# Patient Record
Sex: MALE | Race: WHITE
[De-identification: names, ages, dates, MRNs, and addresses within clinical notes are randomized per-mention and may not be internally consistent; named-entity substitution may affect disease eponyms.]

---

## 2021-06-13 ENCOUNTER — HOSPITAL ENCOUNTER (EMERGENCY)
Dept: HOSPITAL 56 - MW.ED | Age: 26
Discharge: HOME | End: 2021-06-13
Payer: COMMERCIAL

## 2021-06-13 VITALS — SYSTOLIC BLOOD PRESSURE: 174 MMHG | DIASTOLIC BLOOD PRESSURE: 100 MMHG | HEART RATE: 87 BPM

## 2021-06-13 DIAGNOSIS — M54.41: Primary | ICD-10-CM

## 2021-06-13 DIAGNOSIS — E66.9: ICD-10-CM

## 2021-06-13 PROCEDURE — 96372 THER/PROPH/DIAG INJ SC/IM: CPT

## 2021-06-13 PROCEDURE — 99283 EMERGENCY DEPT VISIT LOW MDM: CPT

## 2021-06-13 NOTE — EDM.PDOC
ED HPI GENERAL MEDICAL PROBLEM





- General


Stated Complaint: BACK AND RT LEG PAIN


Time Seen by Provider: 06/13/21 17:19


Source of Information: Reports: Patient


History Limitations: Reports: No Limitations





- History of Present Illness


INITIAL COMMENTS - FREE TEXT/NARRATIVE: 





18-year-old male past medical history sciatica presents for acute exacerbation 

of chronic low back pain.  Patient first noted symptoms back in March of this 

year and has been seeing his primary care physician.  He has never had advanced 

imaging of the back.  He has been on indomethacin and Flexeril as needed and has

been taking it quite frequently.  He picked up a heavy cooler and exacerbated 

his pain and is now experiencing right-sided low back pain that radiates down 

his posterior leg.  It is difficult to find a position of comfort.  Denies 

dysuria, hematuria, urinary incontinence or retention.  He is ambulatory but 

notes it is worse when he is walking and better with rest.


  ** back


Pain Score (Numeric/FACES): 9





- Related Data


                                    Allergies











Allergy/AdvReac Type Severity Reaction Status Date / Time


 


No Known Allergies Allergy   Verified 06/13/21 17:31











Home Meds: 


                                    Home Meds





Amphetamine/Dextroamphetamine [Adderall XR] 1 tab PO ASDIRECTED 06/13/21 

[History]


Cyclobenzaprine [Flexeril] 1 tab PO ASDIRECTED 06/13/21 [History]


Cyclobenzaprine [Flexeril] 10 mg PO TID PRN #30 tab 06/13/21 [Rx]


Diclofenac Submicronized [Diclofenac] 1 tab PO ASDIRECTED 06/13/21 [History]


Ibuprofen [Motrin] 600 mg PO Q6H PRN #28 tab 06/13/21 [Rx]


diazePAM [Valium] 5 mg PO BID PRN #6 tablet 06/13/21 [Rx]











Past Medical History


Respiratory History: Reports: Sleep Apnea


Gastrointestinal History: Reports: GERD


Psychiatric History: Reports: Anxiety, Depression


Endocrine/Metabolic History: Reports: Obesity/BMI 30+ (lost 35 pounds recently)





- Past Surgical History


Musculoskeletal Surgical History: Reports: Shoulder Surgery





ED ROS GENERAL





- Review of Systems


Review Of Systems: Comprehensive ROS is negative, except as noted in HPI.





ED EXAM, GENERAL





- Physical Exam


Exam: See Below


Exam Limited By: No Limitations


General Appearance: Alert, WD/WN, No Apparent Distress


Ears: Normal External Exam


Nose: Normal Inspection


Throat/Mouth: Normal Voice, No Airway Compromise


Head: Atraumatic, Normocephalic


Neck: Normal Inspection, Supple, Non-Tender


Respiratory/Chest: No Respiratory Distress, Lungs Clear, Normal Breath Sounds, 

No Accessory Muscle Use


Cardiovascular: Normal Peripheral Pulses, Regular Rate, Rhythm


Back Exam: Normal Inspection.  No: Vertebral Tenderness


Extremities: Normal Inspection


Neurological: Alert, Normal Cognition, Normal Gait


Psychiatric: Normal Affect, Normal Mood


Skin Exam: Warm, Dry, Intact, Normal Color





Course





- Vital Signs


Last Recorded V/S: 


                                Last Vital Signs











Temp  97.9 F   06/13/21 17:28


 


Pulse  87   06/13/21 17:28


 


Resp  18   06/13/21 17:28


 


BP  174/100 H  06/13/21 17:28


 


Pulse Ox  98   06/13/21 17:28














- Orders/Labs/Meds


Meds: 


Medications














Discontinued Medications














Generic Name Dose Route Start Last Admin





  Trade Name Genaroq  PRN Reason Stop Dose Admin


 


Dexamethasone  10 mg  06/13/21 17:36 





  Dexamethasone 10 Mg/Ml Sdv  IM  06/13/21 17:37 





  STAT STA  


 


Diazepam  2 mg  06/13/21 17:36  06/13/21 17:39





  Diazepam 2 Mg Tab  PO  06/13/21 17:37  Not Given





  ONETIME ONE  


 


Diazepam  4 mg  06/13/21 17:38 





  Diazepam 2 Mg Tab  PO  06/13/21 17:39 





  ONETIME ONE  


 


Ketorolac Tromethamine  30 mg  06/13/21 17:37 





  Ketorolac 30 Mg/Ml Sdv  IM  06/13/21 17:38 





  ONETIME ONE  














- Re-Assessments/Exams


Free Text/Narrative Re-Assessment/Exam: 





06/13/21 17:44


We will treat for likely acute exacerbation of sciatica with Decadron, Valium, 

Toradol.  Will discharge with Motrin, Flexeril, short course of Valium.  

Explained to patient that he should follow-up with his primary care physician 

and consider MRI imaging of the back to the way of a better idea of exactly what

 is going on.  Explained return precautions including intolerable pain, muscle 

paralysis, urinary incontinence or retention.  Patient understands and is 

agreeable with plan.





Departure





- Departure


Time of Disposition: 17:39


Disposition: Home, Self-Care 01


Condition: Good


Clinical Impression: 


Low back pain


Qualifiers:


 Chronicity: acute Back pain laterality: right Sciatica presence: with sciatica 

Sciatica laterality: sciatica of right side Qualified Code(s): M54.41 - Lumbago 

with sciatica, right side








- Discharge Information


Prescriptions: 


Cyclobenzaprine [Flexeril] 10 mg PO TID PRN #30 tab


 PRN Reason: Muscle Spasm - Painful


Ibuprofen [Motrin] 600 mg PO Q6H PRN #28 tab


 PRN Reason: Pain


diazePAM [Valium] 5 mg PO BID PRN #6 tablet


 PRN Reason: Muscle Spasm - Painful


Instructions:  Acute Back Pain, Adult


Referrals: 


Leonora Guzman DO [Primary Care Provider] - 


Additional Instructions: 


Your symptoms are suggestive of sciatica.  You should really follow-up with your

PMD and consider getting an MRI of the back so that we have a better picture of 

exactly what is going on.  I sent 3 medications your pharmacy.  One is just a 

high-dose Motrin that you can take every 6 hours to help with pain and 

inflammation.  The other 1 is called Flexeril or cyclobenzaprine which I know 

you are pretty familiar with.  Another medication is called Valium which is a 

powerful muscle relaxant that can make you drowsy and dizzy.  Do not drive or 

drink alcohol with this medication.  Do not take the Flexeril and the Valium 

together.  The Valium is for the next couple of days to help acutely and the 

Flexeril is more for longer term use.





You can also take Tylenol 1000 mg in addition to the Motrin.  These are safe to 

take together and although Tylenol is not a particularly strong pain medication 

it can help augment the Motrin to work better.


_____________________________________________________________


The following information is given to patients seen in the emergency department 

who are being discharged to home. This information is to outline your options 

for follow-up care. We provide all patients seen in our emergency department 

with a follow-up referral.





The need for follow-up, as well as the timing and circumstances, are variable 

depending upon the specifics of your emergency department visit.





If you don't have a primary care physician on staff, we will provide you with a 

referral. We always advise you to contact your personal physician following an 

emergency department visit to inform them of the circumstance of the visit and 

for follow-up with them and/or the need for any referrals to a consulting 

specialist.





The emergency department will also refer you to a specialist when appropriate. 

This referral assures that you have the opportunity for follow-up care with a 

specialist. All of these measure are taken in an effort to provide you with 

optimal care, which includes your follow-up.





Under all circumstances we always encourage you to contact your private 

physician who remains a resource for coordinating your care. When calling for 

follow-up care, please make the office aware that this follow-up is from your 

recent emergency room visit. If for any reason you are refused follow-up, please

contact the [facility] Emergency Department

at [phone] and asked to speak to the emergency department charge nurse.





Please follow up with your primary care physician. If you do not have a primary 

care physician, see below:


[facility]


[address] [place] [address] [phone]





[facility]


[address] [place] [address] [phone]








[facility]


[address] [place] [address]


Phone: [phone]


Fax: [phone]








Sepsis Event Note (ED)





- Focused Exam


Vital Signs: 


                                   Vital Signs











  Temp Pulse Resp BP Pulse Ox


 


 06/13/21 17:28  97.9 F  87  18  174/100 H  98